# Patient Record
Sex: FEMALE | URBAN - METROPOLITAN AREA
[De-identification: names, ages, dates, MRNs, and addresses within clinical notes are randomized per-mention and may not be internally consistent; named-entity substitution may affect disease eponyms.]

---
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
## 2024-01-15 PROBLEM — Z91.51 HISTORY OF ATTEMPTED SUICIDE: Status: ACTIVE | Noted: 2024-01-15

## 2024-01-15 ASSESSMENT — ENCOUNTER SYMPTOMS
SHORTNESS OF BREATH: 0
CHILLS: 0
FEVER: 0

## 2024-01-16 ENCOUNTER — OFFICE VISIT (OUTPATIENT)
Dept: MEDICAL GROUP | Facility: PHYSICIAN GROUP | Age: 19
End: 2024-01-16
Payer: COMMERCIAL

## 2024-01-16 VITALS
WEIGHT: 118.4 LBS | HEIGHT: 63 IN | RESPIRATION RATE: 16 BRPM | OXYGEN SATURATION: 98 % | TEMPERATURE: 98.5 F | SYSTOLIC BLOOD PRESSURE: 100 MMHG | DIASTOLIC BLOOD PRESSURE: 74 MMHG | BODY MASS INDEX: 20.98 KG/M2 | HEART RATE: 84 BPM

## 2024-01-16 DIAGNOSIS — F40.10 SOCIAL ANXIETY DISORDER: ICD-10-CM

## 2024-01-16 DIAGNOSIS — R53.83 OTHER FATIGUE: ICD-10-CM

## 2024-01-16 DIAGNOSIS — E55.9 VITAMIN D DEFICIENCY: ICD-10-CM

## 2024-01-16 DIAGNOSIS — Z91.51 HISTORY OF ATTEMPTED SUICIDE: ICD-10-CM

## 2024-01-16 DIAGNOSIS — Z23 NEED FOR VACCINATION: ICD-10-CM

## 2024-01-16 DIAGNOSIS — M79.641 PAIN IN BOTH HANDS: ICD-10-CM

## 2024-01-16 DIAGNOSIS — Z13.6 SCREENING FOR CARDIOVASCULAR CONDITION: ICD-10-CM

## 2024-01-16 DIAGNOSIS — Z11.3 SCREENING EXAMINATION FOR SEXUALLY TRANSMITTED DISEASE: ICD-10-CM

## 2024-01-16 DIAGNOSIS — M79.642 PAIN IN BOTH HANDS: ICD-10-CM

## 2024-01-16 DIAGNOSIS — Z11.59 NEED FOR HEPATITIS C SCREENING TEST: ICD-10-CM

## 2024-01-16 PROCEDURE — 90471 IMMUNIZATION ADMIN: CPT | Performed by: PHYSICIAN ASSISTANT

## 2024-01-16 PROCEDURE — 3074F SYST BP LT 130 MM HG: CPT | Performed by: PHYSICIAN ASSISTANT

## 2024-01-16 PROCEDURE — 3078F DIAST BP <80 MM HG: CPT | Performed by: PHYSICIAN ASSISTANT

## 2024-01-16 PROCEDURE — 90686 IIV4 VACC NO PRSV 0.5 ML IM: CPT | Performed by: PHYSICIAN ASSISTANT

## 2024-01-16 PROCEDURE — 99204 OFFICE O/P NEW MOD 45 MIN: CPT | Mod: 25 | Performed by: PHYSICIAN ASSISTANT

## 2024-01-16 RX ORDER — MEDROXYPROGESTERONE ACETATE 150 MG/ML
INJECTION, SUSPENSION INTRAMUSCULAR
COMMUNITY
Start: 2023-10-30

## 2024-01-16 ASSESSMENT — FIBROSIS 4 INDEX: FIB4 SCORE: 0.23

## 2024-01-16 NOTE — PROGRESS NOTES
"Subjective:     CC: establish care; NEW PATIENT    HPI:   Jackie presents today with her mother for the following:      Problem   Pain in Both Hands    Chronic, uncontrolled.  Hand/wrist pain, bilaterally.  Started \"a few years\" ago.  Only when doing certain things.  In cosmetology school.  No numb/tingling.  No issues at night.  OTC medications tried: none   Needs a note for school saying she cannot do certain activities that exacerbate her pain.       History of Attempted Suicide    Chronic, controlled.  Was seeing therapy for about 6 sessions since ED visit 10/20/2023.  Didn't feel the therapist was working for her.  Will let me know if she wants to see a new therapist.    Interval history:  ED evaluation 10/20/2023:                  ROS:  Review of Systems   Constitutional:  Negative for chills and fever.   Respiratory:  Negative for shortness of breath.    Cardiovascular:  Negative for chest pain.       Objective:     Exam:  /74 (BP Location: Left arm, Patient Position: Sitting, BP Cuff Size: Adult)   Pulse 84   Temp 36.9 °C (98.5 °F) (Temporal)   Resp 16   Ht 1.6 m (5' 3\")   Wt 53.7 kg (118 lb 6.4 oz)   LMP 01/15/2024 (Exact Date)   SpO2 98%   Breastfeeding No   BMI 20.97 kg/m²  Body mass index is 20.97 kg/m².    Physical Exam  Vitals reviewed.   Constitutional:       General: She is not in acute distress.     Appearance: Normal appearance.   Pulmonary:      Effort: Pulmonary effort is normal.   Neurological:      General: No focal deficit present.      Mental Status: She is alert.   Psychiatric:         Mood and Affect: Mood normal.         Behavior: Behavior normal.         Judgment: Judgment normal.               Assessment & Plan:     18 y.o. female with the following -     1. Social anxiety disorder  Chronic, uncontrolled.  Recommend therapy but patient appears hesitant.  Advised that she let me know via [place] if she wants to see a new therapist.  Reset patient's password during clinic " visit.    2. Pain in both hands  Chronic, uncontrolled.  Recommend wrist braces at night and anti-inflammatories.  May need to consider EMG studies if pain persist.  Denies numbness, tingling.  Note requested for school allowing her to avoid doing certain activities that exacerbate her symptoms.    3. History of attempted suicide  Patient was seeing a therapist for a while but did not click with a therapist.  Recommend referring back to therapy but patient appears hesitant.  Advised that she let me know if she would like to see another therapist.    4. Other fatigue  Chronic, intermittent.  Checking labs.    - CBC WITH DIFFERENTIAL; Future  - Comp Metabolic Panel; Future  - TSH WITH REFLEX TO FT4; Future    5. Vitamin D deficiency  Chronic, control unknown.  Checking labs.    - VITAMIN D,25 HYDROXY (DEFICIENCY); Future    6. Screening examination for sexually transmitted disease    - HIV AG/AB COMBO ASSAY SCREENING; Future  - T.PALLIDUM AB SHERMAN (SCREENING); Future  - Chlamydia/GC, PCR (Urine); Future    7. Need for hepatitis C screening test    - HEP C VIRUS ANTIBODY; Future    8. Screening for cardiovascular condition    - Lipid Profile; Future    9. Need for vaccination    - INFLUENZA VACCINE QUAD INJ (PF)          Healthcare Maintenance:        Return if symptoms worsen or fail to improve.    Please note that this dictation was created using voice recognition software. I have made every reasonable attempt to correct obvious errors, but I expect that there are errors of grammar and possibly content that I did not discover before finalizing the note.

## 2024-01-16 NOTE — LETTER
January 16, 2024    To Whom It May Concern:         This is confirmation that Jackie Vela attended her scheduled appointment with Kathleen De Luna P.A.-C. on 1/16/24.    She is unable to do pedicures, perms, and other activities requiring fine motor skills and gripping as it causes hand pain.          If you have any questions please do not hesitate to call me at the phone number listed below.    Sincerely,          Kathleen De Luna P.A.-C.  [phone]